# Patient Record
Sex: FEMALE | Race: BLACK OR AFRICAN AMERICAN | Employment: OTHER | URBAN - METROPOLITAN AREA
[De-identification: names, ages, dates, MRNs, and addresses within clinical notes are randomized per-mention and may not be internally consistent; named-entity substitution may affect disease eponyms.]

---

## 2022-11-02 ENCOUNTER — OFFICE VISIT (OUTPATIENT)
Dept: INTERNAL MEDICINE CLINIC | Age: 72
End: 2022-11-02
Payer: MEDICARE

## 2022-11-02 VITALS
SYSTOLIC BLOOD PRESSURE: 122 MMHG | HEIGHT: 65 IN | DIASTOLIC BLOOD PRESSURE: 80 MMHG | OXYGEN SATURATION: 98 % | BODY MASS INDEX: 32.65 KG/M2 | WEIGHT: 196 LBS | TEMPERATURE: 98.2 F | HEART RATE: 86 BPM

## 2022-11-02 DIAGNOSIS — I10 PRIMARY HYPERTENSION: Primary | ICD-10-CM

## 2022-11-02 DIAGNOSIS — R82.90 ABNORMAL URINALYSIS: ICD-10-CM

## 2022-11-02 DIAGNOSIS — E66.9 OBESITY (BMI 30-39.9): ICD-10-CM

## 2022-11-02 DIAGNOSIS — E78.2 MIXED HYPERLIPIDEMIA: ICD-10-CM

## 2022-11-02 DIAGNOSIS — L28.2 PRURITIC RASH: ICD-10-CM

## 2022-11-02 DIAGNOSIS — H93.13 TINNITUS, BILATERAL: ICD-10-CM

## 2022-11-02 DIAGNOSIS — C21.0 ANAL CANCER (HCC): ICD-10-CM

## 2022-11-02 DIAGNOSIS — H61.23 IMPACTED CERUMEN, BILATERAL: ICD-10-CM

## 2022-11-02 DIAGNOSIS — Z12.31 SCREENING MAMMOGRAM FOR BREAST CANCER: ICD-10-CM

## 2022-11-02 LAB
BILIRUBIN, POC: NEGATIVE
BLOOD URINE, POC: NORMAL
CLARITY, POC: CLEAR
COLOR, POC: YELLOW
GLUCOSE URINE, POC: NEGATIVE
KETONES, POC: NORMAL
LEUKOCYTE EST, POC: NORMAL
NITRITE, POC: NEGATIVE
PH, POC: 5.5
PROTEIN, POC: NORMAL
SPECIFIC GRAVITY, POC: 1.02
UROBILINOGEN, POC: NORMAL

## 2022-11-02 PROCEDURE — 1123F ACP DISCUSS/DSCN MKR DOCD: CPT | Performed by: INTERNAL MEDICINE

## 2022-11-02 PROCEDURE — 3074F SYST BP LT 130 MM HG: CPT | Performed by: INTERNAL MEDICINE

## 2022-11-02 PROCEDURE — 81002 URINALYSIS NONAUTO W/O SCOPE: CPT | Performed by: INTERNAL MEDICINE

## 2022-11-02 PROCEDURE — 99204 OFFICE O/P NEW MOD 45 MIN: CPT | Performed by: INTERNAL MEDICINE

## 2022-11-02 PROCEDURE — 3079F DIAST BP 80-89 MM HG: CPT | Performed by: INTERNAL MEDICINE

## 2022-11-02 RX ORDER — SULFAMETHOXAZOLE AND TRIMETHOPRIM 800; 160 MG/1; MG/1
1 TABLET ORAL 2 TIMES DAILY
Qty: 14 TABLET | Refills: 0 | Status: SHIPPED | OUTPATIENT
Start: 2022-11-02 | End: 2022-11-02

## 2022-11-02 RX ORDER — ROSUVASTATIN CALCIUM 20 MG/1
TABLET, COATED ORAL
COMMUNITY
Start: 2022-09-15

## 2022-11-02 RX ORDER — AMLODIPINE BESYLATE 5 MG/1
TABLET ORAL
COMMUNITY
Start: 2022-09-15

## 2022-11-02 RX ORDER — CLOBETASOL PROPIONATE 0.5 MG/G
OINTMENT TOPICAL
Qty: 30 G | Refills: 0 | Status: SHIPPED | OUTPATIENT
Start: 2022-11-02

## 2022-11-02 RX ORDER — LOSARTAN POTASSIUM 100 MG/1
TABLET ORAL
COMMUNITY
Start: 2022-09-23

## 2022-11-02 RX ORDER — VITAMIN B COMPLEX
1 CAPSULE ORAL DAILY
COMMUNITY

## 2022-11-02 RX ORDER — SULFAMETHOXAZOLE AND TRIMETHOPRIM 800; 160 MG/1; MG/1
1 TABLET ORAL 2 TIMES DAILY
Qty: 6 TABLET | Refills: 0 | Status: SHIPPED | OUTPATIENT
Start: 2022-11-02 | End: 2022-11-05

## 2022-11-02 SDOH — ECONOMIC STABILITY: FOOD INSECURITY: WITHIN THE PAST 12 MONTHS, THE FOOD YOU BOUGHT JUST DIDN'T LAST AND YOU DIDN'T HAVE MONEY TO GET MORE.: NEVER TRUE

## 2022-11-02 SDOH — ECONOMIC STABILITY: FOOD INSECURITY: WITHIN THE PAST 12 MONTHS, YOU WORRIED THAT YOUR FOOD WOULD RUN OUT BEFORE YOU GOT MONEY TO BUY MORE.: NEVER TRUE

## 2022-11-02 ASSESSMENT — SOCIAL DETERMINANTS OF HEALTH (SDOH): HOW HARD IS IT FOR YOU TO PAY FOR THE VERY BASICS LIKE FOOD, HOUSING, MEDICAL CARE, AND HEATING?: NOT HARD AT ALL

## 2022-11-02 ASSESSMENT — PATIENT HEALTH QUESTIONNAIRE - PHQ9
2. FEELING DOWN, DEPRESSED OR HOPELESS: 0
SUM OF ALL RESPONSES TO PHQ QUESTIONS 1-9: 0
1. LITTLE INTEREST OR PLEASURE IN DOING THINGS: 0
SUM OF ALL RESPONSES TO PHQ QUESTIONS 1-9: 0
SUM OF ALL RESPONSES TO PHQ9 QUESTIONS 1 & 2: 0

## 2022-11-02 NOTE — PROGRESS NOTES
SUBJECTIVE:  Janie Vieira is a 81 y.o. female HERE FOR   Chief Complaint   Patient presents with    New Patient     Establish care      PT HERE TO INITIATE CARE   PREVIOUS PCPDR REFKIN    HTN - ON MEDS - [facility]. ? DIET / EXERCISE COMPLIANCE. DENIES HEADACHE, NO DIZZINESS  HLP - ON MEDS. ? DIET / EXERCISE COMPLIANCE. NO MUSCLE CRAMPS / NO MUSCLE ACHES  TINNITUS - GIULIANO. INTERMITTENT. NO VERTIGO, NO DIZZINESS  H/O ANAL CA -2012. NO SURGERY DONE. S/P RADRX AND CHEMORX  C/O RASH - HAND - LT. ? MONTHS. + OCC ITCHING, ? PAIN. NO KNOWN CHANGE IN SKIN CARE PRODUCTS, NO SICK CONTACTS. MEDS - ? NAME HAD NOT HELPED IN THE PAST  OBESITY - DIET / EXERCISE REVIEWED. WT NOTED  NEEDS SCREENING MAMMOGRAM    DENIES CP, No SOB, No PALPITATIONS, No COUGH, NO F/C  No ABD PAIN, No N/V, No DIARRHEA, No CONSTIPATION, No MELENA, No HEMATOCHEZIA. No DYSURIA, ? No FREQ, No URGENCY, No HEMATURIA    PMH: REVIEWED AND UPDATED TODAY    PSH: REVIEWED AND UPDATED TODAY    SOCIAL HX: REVIEWED AND UPDATED TODAY    FAMILY HX: REVIEWED AND UPDATED TODAY    ALLERGY:  Patient has no known allergies. MEDS: REVIEWED    Prior to Visit Medications    Medication Sig Taking?  Authorizing Provider   amLODIPine (NORVASC) 5 MG tablet TAKE 1 TABLET BY MOUTH DAILY Yes Historical Provider, MD   rosuvastatin (CRESTOR) 20 MG tablet TAKE 1 TABLET BY MOUTH DAILY Yes Historical Provider, MD   losartan (COZAAR) 100 MG tablet TAKE 1 TABLET BY MOUTH DAILY Yes Historical Provider, MD   b complex vitamins capsule Take 1 capsule by mouth daily Yes Historical Provider, MD   Coenzyme Q10 (CO Q 10 PO) Take by mouth Yes Historical Provider, MD   UNABLE  Ck Prieto Yes Historical Provider, MD   Multiple Vitamin (MULTIVITAMIN ADULT PO) Take by mouth Yes Historical Provider, MD       OB/GYN: POST MENOPAUSAL                  LAST PAP SMEAR ? DATE                  LAST MAMMOGRAM ? 2020      IMMUNIZATION: DOES NOT TAKE INFLUENZA, NO PNEUMONIA, NO TETANUS, NO COVID, NO SHINGLES VACCINES    ROS: COMPREHENSIVE ROS AS IN HX, REST -VE  History obtained from chart review and the patient   LAST C SCOPE ? DATE    OBJECTIVE:   NURSING NOTE AND VITALS REVIEWED  /84 (Site: Right Upper Arm, Position: Sitting, Cuff Size: Large Adult)   Pulse 86   Temp 98.2 °F (36.8 °C)   Ht 5' 5\" (1.651 m)   Wt 196 lb (88.9 kg)   SpO2 98%   BMI 32.62 kg/m²     NO ACUTE DISTRESS    REPEAT BP: 122/80 (RT), NO ORTHOSTASIS     Body mass index is 32.62 kg/m². HEENT: NO PALLOR, ANICTERIC, PERRLA, EOMI, NO CONJUNCTIVAL ERYTHEMA,                 NO SINUS TENDERNESS, GIULIANO CERUMEN IMPACTION  NECK:  SUPPLE, TRACHEA MIDLINE, NT, NO JVD, NO CB, NO LA, NO TM, NO STIFFNESS  CHEST: RESPY EFFORT NL, GOOD AE, NO W/R/C  HEART: S1S2+ REG, NO M/G/R  ABD: OBESE, SOFT, NT, NO HSM, BS+  EXT: NO EDEMA, NT, PULSES +. ESTELLA'S -VE  NEURO: ALERT AND ORIENTED X 3, NO MENINGEAL SIGNS, NO TREMORS, AMBULATING WITH A LIMP OTHERWISE, NO FOCAL DEFICITS  PSYCH: FAIRLY GOOD AFFECT  BACK: NT, NO ROM, NO CVA TENDERNESS  SKIN: HYPERPIGMENTED MACULOPAPULAR RASH LT PALM - THENAR ASPECT       PREVIOUS OUTSIDE LABS REVIEWED AND D/W PT    UA: TRACE KETONES, TRACE BLOOD, TRACE PROT, SMALL LEUKOCYYTES    ASSESSMENT / PLAN:     Diagnosis Orders   1. Primary hypertension  COUNSELLED. STABLE ON MEDS. ADVISED LOW NA+ / DASH DIET/ EXERCISE. MONITOR. GOAL </= 130/80  F/U LABS  MAKE CHANGES AS NEEDED. 2. Mixed hyperlipidemia  COUNSELLED. ADVISED LOW FAT / CHOL DIET/ EXERCISE.  MONITOR ON CRESTOR. F/U LABS. GOALS D/W PT.  MAKE CHANGES AS NEEDED. 3. Tinnitus, bilateral  COUNSELLED. DEFERRED AUDIOLOGY EVAL. MONITOR  MAKE CHANGES AS NEEDED. 4. Anal cancer ([facility] [address]  COUNSELLED. NO NEW ISSUES AT THIS TIME  PT ENCOURAGED. F/U ONCOLOGY. MONITOR  MAKE CHANGES AS NEEDED. 5. Pruritic rash - HAND  COUNSELLED. ADVISED MAINTAIN HYGIENE  START ON clobetasol (TEMOVATE) 0.05 % ointment PRN. MONITOR  MAKE CHANGES AS NEEDED.        6. Obesity (BMI 30-39. 9)  COUNSELLED. DIET/ EXERCISE ADVISED. LIFESTYLE MODIFICATION. WT LOSS ADVISED. MONITOR AND MAKE CHANGES AS NEEDED. 7. Abnormal urinalysis  COUNSELLED. RX FOR UTI WITH BACTRIM DS BID X 3 DAYS. F/U LAB  MAKE CHANGES AS NEEDED. 8. Impacted cerumen, bilateral  COUNSELLED. DEBROX EAR DROPS BOTH EARS- F/U EAR IRRIGATION / REMOVAL  MAKE CHANGES AS NEEDED. 9. Screening mammogram for breast cancer  COUNSELLED. DENICE DIGITAL SCREEN W OR WO CAD BILATERAL ORDERED  MAKE CHANGES AS NEEDED.                  PT DECLINED  VACCINATIONS - READDRESS        MEDICATION SIDE EFFECTS D/W PATIENT    RETURN TO CLINIC WITHIN 4 MONTHS / PRN  PHYSICAL / Ira Tay VISIT    FOLLOW UP FOR FASTING LABS, MAMMOGRAM

## 2022-11-02 NOTE — PATIENT INSTRUCTIONS
TAKE MED AS ADVISED    DIET/ EXERCISE. FOLLOW UP WITHIN 4 MONTHS / AS NEEDED    FOLLOW UP FOR FASTING LABS, MAMMOGRAM      [facility] Locations - No appointment necessary. @ indicates the location is open Saturdays in addition to Monday through Friday. Call your preferred location for test preparation, business hours and other information you need. [employer] accepts [employer]. [facility]    @ [facility] [address] [facility] [address] [place] [address]. [place], [address]   Ph: [phone] [facility] [address]   Ph: [phone]  @ [place] [facility] [address] [facility]   Ph: [phone]    [facility] [address] [facility], [address]   Ph: [phone] @ [place] [facility] [address]  Ph: [phone] @ [place] [facility] [address] [facility]. [address]   Ph: [phone]     [facility] [address]  Ph: [phone]    [place]   @ [place] [facility] [address] [place], [place] [address]   Ph: [phone] [facility] [address] [facility], [address] [place] [address]  Ph: [id number] [place] [address] 30:  [address] [facility] [address] [facility]   Ph: [phone] [facility]. Lab S[facility]cs[facility]   [address] [facility], [facility]   Ph: [phone]

## 2022-12-27 ENCOUNTER — TELEPHONE (OUTPATIENT)
Dept: INTERNAL MEDICINE CLINIC | Age: 72
End: 2022-12-27

## 2022-12-27 DIAGNOSIS — E78.2 MIXED HYPERLIPIDEMIA: ICD-10-CM

## 2022-12-27 DIAGNOSIS — I10 PRIMARY HYPERTENSION: ICD-10-CM

## 2022-12-27 NOTE — TELEPHONE ENCOUNTER
Medication:   Requested Prescriptions     Pending Prescriptions Disp Refills    amLODIPine (NORVASC) 5 MG tablet [Pharmacy Med Name: AMLODIPINE BESYLATE 5MG TABLETS] 90 tablet      Sig: TAKE 1 TABLET BY MOUTH DAILY    losartan (COZAAR) 100 MG tablet [Pharmacy Med Name: LOSARTAN 100MG TABLETS] 90 tablet      Sig: TAKE 1 TABLET BY MOUTH DAILY        Last Filled:  12/24/2022    Patient Phone Number: [phone] (home)     Last appt: 11/2/2022   Next appt: 3/9/2023    Last OARRS: No flowsheet data found.

## 2022-12-30 RX ORDER — ROSUVASTATIN CALCIUM 20 MG/1
20 TABLET, COATED ORAL NIGHTLY
Qty: 90 TABLET | Refills: 0 | Status: SHIPPED | OUTPATIENT
Start: 2022-12-30

## 2022-12-30 RX ORDER — AMLODIPINE BESYLATE 5 MG/1
TABLET ORAL
Qty: 90 TABLET | Refills: 0 | Status: SHIPPED | OUTPATIENT
Start: 2022-12-30

## 2022-12-30 RX ORDER — ROSUVASTATIN CALCIUM 20 MG/1
TABLET, COATED ORAL
Qty: 90 TABLET | OUTPATIENT
Start: 2022-12-30

## 2022-12-30 RX ORDER — LOSARTAN POTASSIUM 100 MG/1
TABLET ORAL
Qty: 90 TABLET | Refills: 0 | Status: SHIPPED | OUTPATIENT
Start: 2022-12-30

## 2023-01-18 DIAGNOSIS — E78.2 MIXED HYPERLIPIDEMIA: ICD-10-CM

## 2023-01-18 DIAGNOSIS — R82.90 ABNORMAL URINALYSIS: ICD-10-CM

## 2023-01-18 DIAGNOSIS — I10 PRIMARY HYPERTENSION: ICD-10-CM

## 2023-01-18 LAB
A/G RATIO: 2.1 (ref 1.1–2.2)
ALBUMIN SERPL-MCNC: 4.5 G/DL (ref 3.4–5)
ALP BLD-CCNC: 89 U/L (ref 40–129)
ALT SERPL-CCNC: 18 U/L (ref 10–40)
ANION GAP SERPL CALCULATED.3IONS-SCNC: 13 MMOL/L (ref 3–16)
AST SERPL-CCNC: 20 U/L (ref 15–37)
BACTERIA: NORMAL /HPF
BASOPHILS ABSOLUTE: 0.1 K/UL (ref 0–0.2)
BASOPHILS RELATIVE PERCENT: 1.7 %
BILIRUB SERPL-MCNC: 0.3 MG/DL (ref 0–1)
BILIRUBIN URINE: NEGATIVE
BLOOD, URINE: NEGATIVE
BUN BLDV-MCNC: 11 MG/DL (ref 7–20)
CALCIUM SERPL-MCNC: 9.9 MG/DL (ref 8.3–10.6)
CHLORIDE BLD-SCNC: 110 MMOL/L (ref 99–110)
CHOLESTEROL, TOTAL: 122 MG/DL (ref 0–199)
CLARITY: CLEAR
CO2: 22 MMOL/L (ref 21–32)
COLOR: YELLOW
CREAT SERPL-MCNC: 0.8 MG/DL (ref 0.6–1.2)
CREATININE URINE: 158.2 MG/DL (ref 28–259)
EOSINOPHILS ABSOLUTE: 0.1 K/UL (ref 0–0.6)
EOSINOPHILS RELATIVE PERCENT: 2.3 %
EPITHELIAL CELLS, UA: 0 /HPF (ref 0–5)
ESTIMATED AVERAGE GLUCOSE: 125.5 MG/DL
GFR SERPL CREATININE-BSD FRML MDRD: >60 ML/MIN/{1.73_M2}
GLUCOSE BLD-MCNC: 95 MG/DL (ref 70–99)
GLUCOSE URINE: NEGATIVE MG/DL
HBA1C MFR BLD: 6 %
HCT VFR BLD CALC: 42.1 % (ref 36–48)
HDLC SERPL-MCNC: 40 MG/DL (ref 40–60)
HEMOGLOBIN: 13.6 G/DL (ref 12–16)
HYALINE CASTS: 0 /LPF (ref 0–8)
KETONES, URINE: NEGATIVE MG/DL
LDL CHOLESTEROL CALCULATED: 56 MG/DL
LEUKOCYTE ESTERASE, URINE: NEGATIVE
LYMPHOCYTES ABSOLUTE: 2.3 K/UL (ref 1–5.1)
LYMPHOCYTES RELATIVE PERCENT: 39.2 %
MCH RBC QN AUTO: 26.5 PG (ref 26–34)
MCHC RBC AUTO-ENTMCNC: 32.3 G/DL (ref 31–36)
MCV RBC AUTO: 82.2 FL (ref 80–100)
MICROALBUMIN UR-MCNC: 4.7 MG/DL
MICROALBUMIN/CREAT UR-RTO: 29.7 MG/G (ref 0–30)
MICROSCOPIC EXAMINATION: YES
MONOCYTES ABSOLUTE: 0.6 K/UL (ref 0–1.3)
MONOCYTES RELATIVE PERCENT: 10.8 %
NEUTROPHILS ABSOLUTE: 2.7 K/UL (ref 1.7–7.7)
NEUTROPHILS RELATIVE PERCENT: 46 %
NITRITE, URINE: NEGATIVE
PDW BLD-RTO: 14.3 % (ref 12.4–15.4)
PH UA: 5.5 (ref 5–8)
PLATELET # BLD: 181 K/UL (ref 135–450)
PMV BLD AUTO: 11 FL (ref 5–10.5)
POTASSIUM SERPL-SCNC: 3.8 MMOL/L (ref 3.5–5.1)
PROTEIN UA: ABNORMAL MG/DL
RBC # BLD: 5.11 M/UL (ref 4–5.2)
RBC UA: 0 /HPF (ref 0–4)
SODIUM BLD-SCNC: 145 MMOL/L (ref 136–145)
SPECIFIC GRAVITY UA: 1.02 (ref 1–1.03)
TOTAL PROTEIN: 6.6 G/DL (ref 6.4–8.2)
TRIGL SERPL-MCNC: 132 MG/DL (ref 0–150)
TSH REFLEX: 2.25 UIU/ML (ref 0.27–4.2)
URINE REFLEX TO CULTURE: ABNORMAL
URINE TYPE: ABNORMAL
UROBILINOGEN, URINE: 0.2 E.U./DL
VITAMIN D 25-HYDROXY: 38.6 NG/ML
VLDLC SERPL CALC-MCNC: 26 MG/DL
WBC # BLD: 6 K/UL (ref 4–11)
WBC UA: 5 /HPF (ref 0–5)

## 2023-02-14 ENCOUNTER — TELEPHONE (OUTPATIENT)
Dept: INTERNAL MEDICINE CLINIC | Age: 73
End: 2023-02-14